# Patient Record
Sex: FEMALE | Race: AMERICAN INDIAN OR ALASKA NATIVE | URBAN - METROPOLITAN AREA
[De-identification: names, ages, dates, MRNs, and addresses within clinical notes are randomized per-mention and may not be internally consistent; named-entity substitution may affect disease eponyms.]

---

## 2018-04-30 ENCOUNTER — PRE VISIT (OUTPATIENT)
Dept: ORTHOPEDICS | Facility: CLINIC | Age: 63
End: 2018-04-30

## 2018-04-30 NOTE — TELEPHONE ENCOUNTER
FUTURE VISIT INFORMATION      FUTURE VISIT INFORMATION:    Date: 5/2    Time: 12:45    [place] [facility] [place]:    Referring provider:  Christina Booker Dahlberg    Referring providers clinic:  [facility] and [facility]    Reason for visit/diagnosis  Right hand    RECORDS REQUESTED FROM:       Clinic name Comments Records Status Imaging Status   [facility] Surgery on 10/29/13 received n/a   [facility] Multiple surgeries received n/a                             RECORDS STATUS      RECORDS RECEIVED FROM: [facility]   DATE RECEIVED: 4/27   NOTES STATUS DETAILS   OFFICE NOTE from referring provider N/A    OFFICE NOTE from other specialist N/A    DISCHARGE SUMMARY from hospital N/A    DISCHARGE REPORT from the ER N/A    OPERATIVE REPORT Received 10/29/13, 2/6/01,9/17/04,6/16/99   MEDICATION LIST N/A    IMPLANT RECORD/STICKER N/A    LABS     CBC/DIFF N/A    CULTURES N/A    IMAGING  (NEED IMAGES & REPORTS) N/A    INJECTIONS DONE IN RADIOLOGY N/A    MRI N/A    CT SCAN N/A    XRAYS (IMAGES & REPORTS) N/A    TUMOR     PATHOLOGY  Slides & report N/A

## 2018-05-01 ENCOUNTER — PRE VISIT (OUTPATIENT)
Dept: ORTHOPEDICS | Facility: CLINIC | Age: 63
End: 2018-05-01

## 2018-05-01 DIAGNOSIS — M79.641 PAIN OF RIGHT HAND: Primary | ICD-10-CM

## 2018-05-01 NOTE — TELEPHONE ENCOUNTER
Patient being seen for right hand issues.    Patient is new to this provider.  Patient has outside records have been placed in chart prep folder    Patient is coming to clinic for initial consultation.      New right hand xrays will be ordered today.     Patient visit type and questionnaires have been reviewed and are correct for this appointment? Yes     Pinch and : yes    Henny Rosario, ATC

## 2018-05-02 ENCOUNTER — OFFICE VISIT (OUTPATIENT)
Dept: ORTHOPEDICS | Facility: CLINIC | Age: 63
End: 2018-05-02
Payer: COMMERCIAL

## 2018-05-02 ENCOUNTER — RADIANT APPOINTMENT (OUTPATIENT)
Dept: GENERAL RADIOLOGY | Facility: CLINIC | Age: 63
End: 2018-05-02
Attending: ORTHOPAEDIC SURGERY
Payer: COMMERCIAL

## 2018-05-02 VITALS — HEIGHT: 70 IN | BODY MASS INDEX: 32.07 KG/M2 | WEIGHT: 224 LBS

## 2018-05-02 DIAGNOSIS — M79.641 PAIN OF RIGHT HAND: ICD-10-CM

## 2018-05-02 DIAGNOSIS — M79.644 PAIN OF FINGER OF RIGHT HAND: Primary | ICD-10-CM

## 2018-05-02 RX ORDER — LEVOTHYROXINE SODIUM 50 UG/1
50 TABLET ORAL
COMMUNITY
Start: 2018-02-13

## 2018-05-02 RX ORDER — MULTIVITAMIN,THER AND MINERALS
TABLET ORAL
COMMUNITY

## 2018-05-02 RX ORDER — LORATADINE 10 MG/1
10 TABLET ORAL
COMMUNITY

## 2018-05-02 RX ORDER — OMEGA-3 FATTY ACIDS/FISH OIL 300-1000MG
1 CAPSULE ORAL
COMMUNITY

## 2018-05-02 RX ORDER — LOPERAMIDE HCL 2 MG
2 CAPSULE ORAL
COMMUNITY
Start: 2018-02-05

## 2018-05-02 RX ORDER — VERAPAMIL HYDROCHLORIDE 120 MG/1
120 TABLET, FILM COATED, EXTENDED RELEASE ORAL
COMMUNITY
Start: 2018-02-13 | End: 2019-02-13

## 2018-05-02 RX ORDER — PANTOPRAZOLE SODIUM 40 MG/1
40 TABLET, DELAYED RELEASE ORAL
COMMUNITY
Start: 2018-03-07 | End: 2019-03-07

## 2018-05-02 RX ORDER — LISINOPRIL AND HYDROCHLOROTHIAZIDE 12.5; 2 MG/1; MG/1
TABLET ORAL
COMMUNITY
Start: 2018-02-13

## 2018-05-02 NOTE — PROGRESS NOTES
"    [facility]    Kiersten Cortes MRN# [id number]   Age: 63 year old YOB: 1955     Requesting physician: self                   Chief Complaint:   Right upper extremity pain         History of Present Illness (Resident / Clinician):   This patient is a 63 year old female, left-hand dominant, works as an  primarily doing computer work, with a significant past medical history of a neuropathic right upper extremity, status post at least 16 surgeries, who presents for evaluation of painful right upper extremity. The patient states that she was born with a large thumb. She states \"my thumb was bigger than my father's even as a toddler \".  She believes this to be secondary to increased nerve tissue in her thumb. At approximately 18 years old, she underwent surgery for excision of tumor from her right thumb. She is unsure of the pathologic diagnosis however states she believes it was benign. Following the surgery, she still had significant pain in her right upper extremity, most focally around her right thumb and thenar eminence, and therefore underwent excisional arthroplasty of her right thumb MP joint. Following this, she underwent a right thumb MP fusion due to failure to improve her pain following excisional arthroplasty. She also states she had two \"nerve transplants \", whereby the nerves from her \"fingers were transplanted into her forearm\". These procedures did not provide any meaningful relief so she underwent nerve stimulator placement to her median nerve. This did provide her good relief for proximally 5-6 years however, has now completely worn off and is not providing any benefit.  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 This was initially managed with amputation of the distal phalanx however, the thumb continued to \"get black \"and she underwent revision " amputation at the base of her right thumb. Since then, she's had no issues with gangrene in her thumb however has had persistent and worsening pain throughout her right hand and upper extremity. Therefore, she underwent a right-sided sympathectomy which provided 6 months of partial relief however, eventually wore off. Most recently, she underwent radial tunnel release in 2013 in [place]. She states her surgeon at that time explained to her that this would relieve a good portion of her pain in her right upper extremity however, she has not seen a benefit. In fact, she notes her pain is now worse and has gotten progressively worse with each additional surgical intervention. Since onset of her pain approximate 30 years ago, she is noted progression of the symptoms in terms of pattern. The pain initially started at her thumb/thenar eminence, and now involves her entire hand, dorsal forearm, and extends to her distal brachium. She also notes increasing intensity of pain, I present a 6/10 but at worst a 10/10. She feels that her arm has pain at all times and is also clean to the touch at all times however any motion or light touch significantly exacerbates her pain. She states she has never given a diagnosis for her symptoms among the 16 separate surgeries she's had. She describes her pain as aching, electric shocklike, and phantom pain. The pain is present at all times  But exacerbated by any touch, weightbearing, or movement. It radiates from her hand up her arm primarily dorsal radially upper forearm, but is diffuse in nature. She is currently on Effexor which is ineffective for pain. She is previously attempted gabapentin, Percocet, Toradol which all are now provided her adequate relief.  She presents today for repeat evaluation as she notes progression of her symptoms. She states she does not want anymore surgery however has been told by previous doctors that her only options are pain management, including possible  "implantation of a morphine pump which she has declined. She is not interested in narcotics. Outside of diffuse neuropathic pain in her right upper extremity, she has no other questions or concerns at today's visit. She denies any similar symptoms in anyone in her family.  She notes she's also undergone several selective nerve blocks that gave temporary relief only but made her feel like a \"stroke victim \" and did not provide her any meaningful lasting relief.  denies fevers, chills, sob, cp.            Past Medical History:   Hypertension, hypothyroidism, chronic right upper extremity pain    Prior history of blood clot: none  Prior history of bleeding problems: none  Prior history of anesthetic complications: none  Currently on opioids:  none  History of Diabetes: no          Past Surgical History:   1995: right median nerve stimulator palcedavida  6/16/99: Exchange of battery pack for right-sided median nerve stimulator, Bin SRIVASTAVA  2/6/01: Intraneural lysis and resection of neuroma, median nerve, from base of thumb into the proximal forearm plus general neuro lysis radial sensory nerve branch dorsal aspect of hand to forearm, amputation of thumb at base, right thumb, Dr Vasquez  9/17/04: Right thoracoscopic sympathectomy of T2-T4 levels for neuropathic right upper extremity, Dr. Powers  10/29/13: Right radial tunnel release, right ECRB tenotomy, right radial neurectomy and translocation, Dr Booker  Appendectomy, cholecystectomy         Social History:     Social History   Substance Use Topics     Smoking status: Not on file     Smokeless tobacco: Not on file     Alcohol use Not on file             Family History:   Reviewed, noncontributory          Allergies:   Codeine, soybean        Medications:   Loratadine, probiotic, loperamide, levothyroxine, lisinopril hydrochlorothiazide, pantoprazole, verapamil       Review of Systems:   10 point ROS negative unless noted in HPI          Physical Exam:     General: " Awake, alert, appropriate, following commands, NAD.  Neuro: CN II-XII grossly intact.   Skin: No rashes,  skin color normal.  HEENT: Normal.   Lungs: Breathing comfortably and nonlabored, no wheezes or stridor noted.  Heart/Cardiovascular: Regular pulse, no peripheral cyanosis.  Abdomen: Soft, non-tender, non-distended.     Right Upper Extremity: Status post amputation of thumb at 1st cmc joint, thenar atrophy, significant dysesthesias and hyperesthesia to light touch over thenar eminence as well as dorsal radial forearm, prior surgical incisions at hand, forearm and elbow well-healed, no sig swelling/erythema, no gross sign of infection, positive Tinel's at elbow and wrist, positive Wan's, positive Phalen's, positive Finkelstein's, no focal swelling, no focal erythema, no overlying skin change throughout with exception of well-healed surgical incisions, otherwise No deformity, skin intact. No significant tenderness to palpation over clavicle, AC joint, shoulder, arm, diffusely tender to palpation over forearm wrist and hand most pronounced laterally at forearm, radially at wrist and hand, Normal ROM shoulder, elbow, wrist without pain. Motor intact distally with finger flexion/extension/intrinsics, 4/5 strength. SILT ax/m/r/u nerve distributions. Radial pulse palpable, 2+. Compartments soft     No sig b/l LE lymphedema  Psych: normal mood and affect           Data:   Imaging reviewed as well as previous pathology report from 1990 which showed benign neural tissue with perineural fibrosis, no evidence of neuroma or neurofibroma    Imaging demonstrates status post amputation of first CMC joint, leads from median nerve stimulator visualized in place, vascular clips overlying prior surgical site of first CMC amputation, no acute fracture dislocation, no advanced degenerative changes throughout         Impression and Recommendation :   Impression:   Neuropathic right upper extremity  Status post right first CMC  amputation    Recommendations:   We had an extensive discussion with this patient regarding her right upper extremity pain. We explained that on review of her records, physical exam, as well as imaging performed today, we do not have an etiology as to the source of her pain. We are limited in terms of her medical records regarding previous evaluation and operative procedures however, pathology reports do not demonstrate any evidence of neurofibroma or neuroma. Nevertheless, the patient does endorse continual and persistently worsening right hand wrist and forearm pain that sounds neuropathic in nature. She describes a history of nerve tumors although cannot give further details and this is not evident from the records available to us today. She states she cannot get an MRI due to her nerve stimulator. Although that we do not have any additional surgical intervention to offer her, we would like to refer her to a specialist in peripheral nerve surgery for further evaluation given the complexity of her case. Furthermore, she does note some improvement with a 3 week stay in [facility] pain management. She states she is not currently followed by anyone from pain management. We will refer her back to pain management so she can re-engage with this. She also did inquire about the possibility of medical marijuana. We explained that we are not experts in prescribing this substance however we'll refer her to the appropriate clinics for evaluation and possible treatment with medical marijuana. Otherwise, she can continue activity weightbearing as tolerated, and follow-up in our clinic on an as-needed basis for any questions or concerns.    Brian Sullivan MD MS  Orthopedic Surgery, PGY-4       This patient was seen and discussed with Dr. iMller who agrees with the plan     I, Eric Miller, saw and evaluated this patient with the resident and agree with the resident s findings and plan of care as documented in the  resident s note.      Answers for HPI/ROS submitted by the patient on 5/2/2018   General Symptoms: No  Skin Symptoms: No  HENT Symptoms: No  EYE SYMPTOMS: No  HEART SYMPTOMS: No  LUNG SYMPTOMS: No  INTESTINAL SYMPTOMS: No  URINARY SYMPTOMS: No  GYNECOLOGIC SYMPTOMS: No  BREAST SYMPTOMS: No  SKELETAL SYMPTOMS: No  BLOOD SYMPTOMS: No  NERVOUS SYSTEM SYMPTOMS: No  MENTAL HEALTH SYMPTOMS: No

## 2018-05-02 NOTE — NURSING NOTE
"Reason For Visit:   Chief Complaint   Patient presents with     Consult     pt states that her right arm has alot of pain starting from her right hand up to her shoulder pain she said it doesn't stay warm she has had 16 arm surgeries.       Primary MD: No primary care provider on file.  Ref. MD:     ?  No    Age: 63 year old    Occupation:Underwritter.  Currently working? Yes.  Work status?  Full time.  Date of surgery: 2013  Type of surgery: Right radial tunnel release, Right extensor carpi radialis brevis tenotomy,right radial neurectomy and translocation  Smoker: No  Request smoking cessation information: No      Ht 1.778 m (5' 10\")  Wt 101.6 kg (224 lb)  BMI 32.14 kg/m2      Pain Assessment  Patient Currently in Pain: Yes  0-10 Pain Scale: 6  Primary Pain Location: Arm  Pain Orientation: Right    Hand Dominance Evaluation  Hand Dominance: Left          QuickDASH Assessment  QuickDASH Main 5/2/2018   1.Open a tight or new jar. Severe difficulty   2. Do heavy household chores (e.g., wash walls, floors) Moderate difficulty   3. Carry a shopping bag or briefcase. Moderate difficulty   4. Wash your back. No difficulty   5. Use a knife to cut food. Moderate difficulty   6. Recreational activities in which you take some force or impact through your arm, shoulder or hand (e.g., golf, hammering, tennis, etc.). Severe difficulty   7. During the past week, to what extent has your arm, shoulder or hand problem interfered with your normal social activities with family, friends, neighbours or groups? Extremely   8. During the past week, were you limited in your work or other regular daily activities as a result of your arm, shoulder or hand problem? Very limited   9. Arm, shoulder or hand pain. Severe   10.Tingling (pins and needles) in your arm,shoulder or hand. Severe   11. During the past week, how much difficulty have you had sleeping because of the pain in your arm, shoulder or hand? ([place] " number) Severe difficulty   Quickdash Ability Score 63.63          Allergies   Allergen Reactions     Codeine GI Disturbance     Vomiting     Soybean Allergy Unknown       Philomena Root CMA

## 2018-05-02 NOTE — MR AVS SNAPSHOT
After Visit Summary   5/2/2018    Kiersten Cortes    MRN: [id number]           Patient Information     Date Of Birth          1955        Visit Information        Provider Department      5/2/2018 12:45 PM Eric Miller MD [facility]        Today's Diagnoses     Pain of finger of right hand    -  1       Follow-ups after your visit        Additional Services     NEUROSURGERY REFERRAL       Your provider has referred you to: Dr. Herron at [place], [phone]      Please be aware that coverage of these services is subject to the terms and limitations of your health insurance plan.  Call member services at your health plan with any benefit or coverage questions.      Please bring the following with you to your appointment:    (1) Any X-Rays, CTs or MRIs which have been performed.  Contact the facility where they were done to arrange for  prior to your scheduled appointment.   (2) List of current medications  (3) This referral request   (4) Any documents/labs given to you for this referral            NEUROSURGERY REFERRAL       Your provider has referred you to: Dr. Herron at [facility],   [phone]    Reason for referral: Neuropathic right upper extremity, s/p 16 procedures at multiple locations    Please be aware that coverage of these services is subject to the terms and limitations of your health insurance plan.  Call member services at your health plan with any benefit or coverage questions.      Please bring the following with you to your appointment:    (1) Any X-Rays, CTs or MRIs which have been performed.  Contact the facility where they were done to arrange for  prior to your scheduled appointment.   (2) List of current medications  (3) This referral request   (4) Any documents/labs given to you for this referral            PAIN MANAGEMENT REFERRAL       Your provider has referred you to: [facility]: [facility] - [place] [phone] [email or web address]      Please call [phone] to make an appointment. Clinic is located: [facility] [address] [place] [address] [id number] [place] [address]      Please complete the following questions:    Procedure/Referral: Referral Only -  Comprehensive Evaluation and Management    What is your diagnosis for the patient's pain? Peripheral nerve pain, referral being placed for neurovascular surgeon at [place] as well    What are your specific questions for the pain specialist? Chronic/ neuropathic/phantom pain.  History of multiple nerve surgeries.  Any suggestions for pain relief would be helpful.    Are there any red flags that may impact the assessment or management of the patient? None         Please note the Pre-Op Pain Consult must be scheduled 2-3 weeks prior to the patient's surgery.  Patient's trying to schedule within 2 weeks of surgery may not be accommodated.     Pre-Op Pain Consults are only good for 30 days.    **ANY DIAGNOSTIC TESTS THAT ARE NOT IN EPIC SHOULD BE SENT TO THE PAIN CENTER**    REGARDING OPIOID MEDICATIONS:  The discussion of opioids management, appropriateness of therapy, and dosing will be discussed in patients being seen for evaluation.  The pain management clinics are not long-term prescribing clinics, with transition of prescribing of medications ultimately going back to the referring provider/PCP.  If prescribing is taken over at the pain clinic, it is in actively involved patients whom are appropriate for opioids, urine drug screening is completed, and long-term prescribing plan has been determined.  Therefore, we will not be automatically taking over prescribing at the patient's first visit.  Is this agreeable to you? agrees.     Please be aware that coverage of these services is subject to the terms and limitations of your health insurance plan.  Call member services at your  "health plan with any benefit or coverage questions.      Please bring the following with you to your appointment:    (1) Any X-Rays, CTs or MRIs which have been performed.  Contact the facility where they were done to arrange for  prior to your scheduled appointment.    (2) List of current medications   (3) This referral request   (4) Any documents/labs given to you for this referral                  Follow-up notes from your care team     Return if symptoms worsen or fail to improve.      Who to contact     Please call your clinic at [phone] to:    Ask questions about your health    Make or cancel appointments    Discuss your medicines    Learn about your test results    Speak to your doctor            Additional Information About Your Visit        [employer] Information     [employer] gives you secure access to your electronic health record. If you see a primary care provider, you can also send messages to your care team and make appointments. If you have questions, please call your primary care clinic.  If you do not have a primary care provider, please call [phone] and they will assist you.      [employer] is an electronic gateway that provides easy, online access to your medical records. With [employer], you can request a clinic appointment, read your test results, renew a prescription or communicate with your care team.     To access your existing account, please contact your [facility] or call [phone] for assistance.        Care EveryWhere ID     This is your Care EveryWhere ID. This could be used by other organizations to access your [facility] medical records  [id number]        Your Vitals Were     Height BMI (Body Mass Index)                1.778 m (5' 10\") 32.14 kg/m2           Blood Pressure from Last 3 Encounters:   No data found for BP    Weight from Last 3 Encounters:   No data found for Wt              Today, you had the following     No orders found for " display       Primary Care Provider    None Specified       No primary provider on file.        Equal Access to Services     DEISY MCGOWAN : Hadii aad ku haddevonderek Call, wawalialisha gale, taylorvarinder arguetaaleecj portercornelgilberto wood. So [facility] [phone].    ATENCIÓN: Si habla español, tiene a dunham disposición servicios gratuitos de asistencia lingüística. LlVan [facility] [phone].    We comply with applicable federal civil rights laws and [place] laws. We do not discriminate on the basis of race, color, national origin, age, disability, sex, sexual orientation, or gender identity.            Thank you!     Thank you for choosing [facility]  for your care. Our goal is always to provide you with excellent care. Hearing back from our patients is one way we can continue to improve our services. Please take a few minutes to complete the written survey that you may receive in the mail after your visit with us. Thank you!             Your Updated Medication List - Protect others around you: Learn how to safely use, store and throw away your medicines at www.disposemymeds.org.          This list is accurate as of 5/2/18 11:59 PM.  Always use your most recent med list.                   Brand Name Dispense Instructions for use Diagnosis    Acidophilus/Goat Milk Caps           calcium citrate-vitamin D 315-200 MG-UNIT Tabs per tablet    CITRACAL          levothyroxine 50 MCG tablet    SYNTHROID/LEVOTHROID     Take 50 mcg by mouth        lisinopril-hydrochlorothiazide 20-12.5 MG per tablet    PRINZIDE/ZESTORETIC          loperamide 2 MG capsule    IMODIUM     Take 2 mg by mouth        loratadine 10 MG tablet    CLARITIN     Take 10 mg by mouth        omega 3 1000 MG Caps      Take 1 g by mouth        pantoprazole 40 MG EC tablet    PROTONIX     Take 40 mg by mouth        verapamil 120 MG Tbcr CR tablet    CALAN-SR     Take 120 mg by mouth        vitamin  s/Minerals Tabs

## 2018-05-02 NOTE — LETTER
"5/2/2018       RE: Kiersten Cortes  [address] [place] [address]     Dear Colleague,    Thank you for referring your patient, Kiersten Cortes, to the [facility] at [facility]. Please see a copy of my visit note below.        [facility] Orth[facility]edic [facility]ation    Kiersten Cortes MRN# [id number]   Age: 63 year old YOB: 1955     Requesting physician: self                   Chief Complaint:   Right upper extremity pain         History of Present Illness (Resident / Clinician):   This patient is a 63 year old female, left-hand dominant, works as an  primarily doing computer work, with a significant past medical history of a neuropathic right upper extremity, status post at least 16 surgeries, who presents for evaluation of painful right upper extremity. The patient states that she was born with a large thumb. She states \"my thumb was bigger than my father's even as a toddler \".  She believes this to be secondary to increased nerve tissue in her thumb. At approximately 18 years old, she underwent surgery for excision of tumor from her right thumb. She is unsure of the pathologic diagnosis however states she believes it was benign. Following the surgery, she still had significant pain in her right upper extremity, most focally around her right thumb and thenar eminence, and therefore underwent excisional arthroplasty of her right thumb MP joint. Following this, she underwent a right thumb MP fusion due to failure to improve her pain following excisional arthroplasty. She also states she had two \"nerve transplants \", whereby the nerves from her \"fingers were transplanted into her forearm\". These procedures did not provide any meaningful relief so she underwent nerve stimulator placement to her median nerve. This did provide her good relief for proximally 5-6 years however, has now completely worn off and is " "not providing any benefit.  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 This was initially managed with amputation of the distal phalanx however, the thumb continued to \"get black \"and she underwent revision amputation at the base of her right thumb. Since then, she's had no issues with gangrene in her thumb however has had persistent and worsening pain throughout her right hand and upper extremity. Therefore, she underwent a right-sided sympathectomy which provided 6 months of partial relief however, eventually wore off. Most recently, she underwent radial tunnel release in 2013 in [place]. She states her surgeon at that time explained to her that this would relieve a good portion of her pain in her right upper extremity however, she has not seen a benefit. In fact, she notes her pain is now worse and has gotten progressively worse with each additional surgical intervention. Since onset of her pain approximate 30 years ago, she is noted progression of the symptoms in terms of pattern. The pain initially started at her thumb/thenar eminence, and now involves her entire hand, dorsal forearm, and extends to her distal brachium. She also notes increasing intensity of pain, I present a 6/10 but at worst a 10/10. She feels that her arm has pain at all times and is also clean to the touch at all times however any motion or light touch significantly exacerbates her pain. She states she has never given a diagnosis for her symptoms among the 16 separate surgeries she's had. She describes her pain as aching, electric shocklike, and phantom pain. The pain is present at all times  But exacerbated by any touch, weightbearing, or movement. It radiates from her hand up her arm primarily dorsal radially upper forearm, but is diffuse in nature. She is currently on Effexor which is ineffective for pain. She is previously attempted gabapentin, Percocet, Toradol " "which all are now provided her adequate relief.  She presents today for repeat evaluation as she notes progression of her symptoms. She states she does not want anymore surgery however has been told by previous doctors that her only options are pain management, including possible implantation of a morphine pump which she has declined. She is not interested in narcotics. Outside of diffuse neuropathic pain in her right upper extremity, she has no other questions or concerns at today's visit. She denies any similar symptoms in anyone in her family.  She notes she's also undergone several selective nerve blocks that made her feel like a \"stroke victim \", but did not provide her any meaningful lasting relief.  denies fevers, chills, sob, cp.            Past Medical History:   Hypertension, hypothyroidism, chronic right upper extremity pain    Prior history of blood clot: none  Prior history of bleeding problems: none  Prior history of anesthetic complications: none  Currently on opioids:  none  History of Diabetes: no          Past Surgical History:   1995: right median nerve stimulator palcement  6/16/99: Exchange of battery pack for right-sided median nerve stimulator, Bin SRIVASTAVA  2/6/01: Intraneural lysis and resection of neuroma, median nerve, from base of thumb into the proximal forearm plus general neuro lysis radial sensory nerve branch dorsal aspect of hand to forearm, amputation of thumb at base, right thumb, Dr Vasquez  9/17/04: Right thoracoscopic sympathectomy of T2-T4 levels for neuropathic right upper extremity, Dr. Powers  10/29/13: Right radial tunnel release, right ECRB tenotomy, right radial neurectomy and translocation, Dr Booker  Appendectomy, cholecystectomy         Social History:     Social History   Substance Use Topics     Smoking status: Not on file     Smokeless tobacco: Not on file     Alcohol use Not on file             Family History:   Reviewed, noncontributory          Allergies: "   Codeine, soybean        Medications:   Loratadine, probiotic, loperamide, levothyroxine, lisinopril hydrochlorothiazide, pantoprazole, verapamil       Review of Systems:   10 point ROS negative unless noted in HPI          Physical Exam:     General: Awake, alert, appropriate, following commands, NAD.  Neuro: CN II-XII grossly intact.   Skin: No rashes,  skin color normal.  HEENT: Normal.   Lungs: Breathing comfortably and nonlabored, no wheezes or stridor noted.  Heart/Cardiovascular: Regular pulse, no peripheral cyanosis.  Abdomen: Soft, non-tender, non-distended.     Right Upper Extremity: Status post amputation of thumb at 1st cmc joint, thenar atrophy, significant dysesthesias and hyperesthesia to light touch over thenar eminence as well as dorsal radial forearm, prior surgical incisions at hand, forearm and elbow well-healed, no sig swelling/erythema, no gross sign of infection, positive Tinel's at elbow and wrist, positive Wan's, positive Phalen's, positive Finkelstein's, no focal swelling, no focal erythema, no overlying skin change throughout with exception of well-healed surgical incisions, otherwise No deformity, skin intact. No significant tenderness to palpation over clavicle, AC joint, shoulder, arm, diffusely tender to palpation over forearm wrist and hand most pronounced laterally at forearm, radially at wrist and hand, Normal ROM shoulder, elbow, wrist without pain. Motor intact distally with finger flexion/extension/intrinsics, 4/5 strength. SILT ax/m/r/u nerve distributions. Radial pulse palpable, 2+. Compartments soft     No sig b/l LE lymphedema  Psych: normal mood and affect           Data:   Imaging reviewed as well as previous pathology report from 1990 which showed benign neural tissue with perineural fibrosis, no evidence of neuroma or neurofibroma    Imaging demonstrates status post amputation of first CMC joint, leads from median nerve stimulator visualized in place, vascular clips  overlying prior surgical site of first CMC amputation, no acute fracture dislocation, no advanced degenerative changes throughout         Impression and Recommendation :   Impression:   Neuropathic right upper extremity  Status post right first CMC amputation    Recommendations:   We had an extensive discussion with this patient regarding her right upper extremity pain. We explained that on review of her records, physical exam, as well as imaging performed today, we do not have an etiology as to the source of her pain. We are limited in terms of her medical records regarding previous operative procedures however, pathology reports do not demonstrate any evidence of neurofibroma or neuroma. Nevertheless, the patient does endorse continual and persistently worsening right hand wrist and forearm pain. Although that we do not have any additional surgical intervention to offer her, we would like to refer her to a specialist in peripheral nerve surgery. Furthermore, she does note some improvement with a 3 week stay in [facility] pain management, and we'll therefore refer her back to our pain management program. She did inquire about the possibility of medical marijuana. We explained that we are not experts in prescribing this substance however we'll refer her to the appropriate clinics for evaluation and possible treatment with medical marijuana. Otherwise, she can continue activity weightbearing as tolerated, and follow-up in our clinic on an as-needed basis for any questions or concerns.    Brian Sullivan MD MS  Orthopedic Surgery, PGY-4       This patient was seen and discussed with Dr. Miller who agrees with the plan             Again, thank you for allowing me to participate in the care of your patient.      Sincerely,    Eric Miller MD

## 2018-05-16 ENCOUNTER — TELEPHONE (OUTPATIENT)
Dept: ORTHOPEDICS | Facility: CLINIC | Age: 63
End: 2018-05-16

## 2018-05-16 NOTE — TELEPHONE ENCOUNTER
[facility] Call Center    Phone Message    May a detailed message be left on voicemail: yes    Reason for Call: Other: Sammie calling from [facility] needs imaging for pt pushed to them     Action Taken: Message routed to:  [facility] Clinic      5/16/18  9:37am  Films dated 5/2/18 pushed through by Stpehanie.

## 2018-05-24 ENCOUNTER — TELEPHONE (OUTPATIENT)
Dept: ORTHOPEDICS | Facility: CLINIC | Age: 63
End: 2018-05-24

## 2018-05-24 NOTE — TELEPHONE ENCOUNTER
[facility] Neurosurgery Department was contacted per request of note below.  Per [facility], the patient's imaging has not been received.  A message was left for Stephanie MACEDO, imaging coordinator, to push imaging and report again.    Dominga Christianson LPN

## 2018-05-24 NOTE — TELEPHONE ENCOUNTER
[facility] Call Center    Phone Message    May a detailed message be left on voicemail: no    Reason for Call: Other: Sumit from [facility] in [place] looking for additional records for the pt. Sumit stated that the recieved a 4 page faxe, but did not get records as to if the pt had an EMG, radiology reports or imaging     Action Taken: Message routed to:  [facility] clinic

## 2019-02-15 ENCOUNTER — HEALTH MAINTENANCE LETTER (OUTPATIENT)
Age: 64
End: 2019-02-15

## 2019-10-03 ENCOUNTER — HEALTH MAINTENANCE LETTER (OUTPATIENT)
Age: 64
End: 2019-10-03

## 2019-12-16 ENCOUNTER — HEALTH MAINTENANCE LETTER (OUTPATIENT)
Age: 64
End: 2019-12-16

## 2021-01-15 ENCOUNTER — HEALTH MAINTENANCE LETTER (OUTPATIENT)
Age: 66
End: 2021-01-15

## 2021-09-05 ENCOUNTER — HEALTH MAINTENANCE LETTER (OUTPATIENT)
Age: 66
End: 2021-09-05

## 2021-10-31 ENCOUNTER — HEALTH MAINTENANCE LETTER (OUTPATIENT)
Age: 66
End: 2021-10-31

## 2022-02-20 ENCOUNTER — HEALTH MAINTENANCE LETTER (OUTPATIENT)
Age: 67
End: 2022-02-20

## 2022-10-23 ENCOUNTER — HEALTH MAINTENANCE LETTER (OUTPATIENT)
Age: 67
End: 2022-10-23

## 2023-04-02 ENCOUNTER — HEALTH MAINTENANCE LETTER (OUTPATIENT)
Age: 68
End: 2023-04-02

## 2023-11-05 ENCOUNTER — HEALTH MAINTENANCE LETTER (OUTPATIENT)
Age: 68
End: 2023-11-05